# Patient Record
Sex: MALE | Race: WHITE | NOT HISPANIC OR LATINO
[De-identification: names, ages, dates, MRNs, and addresses within clinical notes are randomized per-mention and may not be internally consistent; named-entity substitution may affect disease eponyms.]

---

## 2020-04-03 ENCOUNTER — APPOINTMENT (OUTPATIENT)
Dept: DISASTER EMERGENCY | Facility: CLINIC | Age: 40
End: 2020-04-03
Payer: COMMERCIAL

## 2020-04-03 VITALS
TEMPERATURE: 99.3 F | OXYGEN SATURATION: 97 % | HEART RATE: 78 BPM | SYSTOLIC BLOOD PRESSURE: 114 MMHG | RESPIRATION RATE: 17 BRPM | DIASTOLIC BLOOD PRESSURE: 78 MMHG

## 2020-04-03 DIAGNOSIS — R68.89 OTHER GENERAL SYMPTOMS AND SIGNS: ICD-10-CM

## 2020-04-03 PROBLEM — Z00.00 ENCOUNTER FOR PREVENTIVE HEALTH EXAMINATION: Status: ACTIVE | Noted: 2020-04-03

## 2020-04-03 PROCEDURE — 99213 OFFICE O/P EST LOW 20 MIN: CPT

## 2020-04-03 NOTE — HISTORY OF PRESENT ILLNESS
[] : no dizziness on standing [With Suspected Case] : patient exposed to a suspected case of COVID-19 [Clear] : clear [Good Air Entry] : good air entry [Speaks in full sentences] : speaks in full sentences [Normal O2 sat at rest] : normal O2 sat at rest [Grossly normal, interacts, not tired or weak] : grossly normal, interacts, not tired or weak [FreeTextEntry1] : 7 days ago started having fever up to 101F, lower back pain,. Patient states sore throat went away, He has been taking tylenol for the fever, took tylenol last night for chills, feels SOB when  he goes up an  down the stairs   Patient states forehead and lower back are hurting. 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 Patient made aware that the test is uncomfortable\par He is advised to self quarantine, monitor temperature, remain  self quarantine un til he has no fever x 3 days without  \par anti fever medications, then practice social isolation  at all times

## 2020-04-07 LAB — SARS-COV-2 N GENE NPH QL NAA+PROBE: ABNORMAL

## 2020-12-08 ENCOUNTER — APPOINTMENT (OUTPATIENT)
Dept: NEUROLOGY | Facility: CLINIC | Age: 40
End: 2020-12-08
Payer: COMMERCIAL

## 2020-12-08 VITALS
DIASTOLIC BLOOD PRESSURE: 85 MMHG | SYSTOLIC BLOOD PRESSURE: 153 MMHG | BODY MASS INDEX: 24.34 KG/M2 | HEIGHT: 70 IN | HEART RATE: 83 BPM | WEIGHT: 170 LBS

## 2020-12-08 DIAGNOSIS — Z87.891 PERSONAL HISTORY OF NICOTINE DEPENDENCE: ICD-10-CM

## 2020-12-08 DIAGNOSIS — U07.1 COVID-19: ICD-10-CM

## 2020-12-08 DIAGNOSIS — R53.1 WEAKNESS: ICD-10-CM

## 2020-12-08 DIAGNOSIS — G44.51 HEMICRANIA CONTINUA: ICD-10-CM

## 2020-12-08 DIAGNOSIS — H53.40 UNSPECIFIED VISUAL FIELD DEFECTS: ICD-10-CM

## 2020-12-08 PROCEDURE — 99072 ADDL SUPL MATRL&STAF TM PHE: CPT

## 2020-12-08 PROCEDURE — 99205 OFFICE O/P NEW HI 60 MIN: CPT

## 2020-12-27 ENCOUNTER — APPOINTMENT (OUTPATIENT)
Dept: MRI IMAGING | Facility: CLINIC | Age: 40
End: 2020-12-27

## 2020-12-29 NOTE — ASSESSMENT
This RN arrived to ED01 to take patient to 6B after prior attempted transfer to 6B failed when daughter informed 6B that pt has a c-diff history.  Spoke with Daylin BURGESS RN in the ED who spoke with infectious disease MD. Per Daylin, the pt is not having loose stools and is okay to be in a double room. Pt transferred to 6B, upon transfer, Katharine LE 6B nurse assisted with moving patient to hospital bed.  Pt noted to be laying in a puddle of loose, foul smelling stool, with some dried to patient bottom.  Sample sent to lab per protocol.     [FreeTextEntry1] : 41 yo male h/o recent Covid-19 infection in April presents  with headache, dizziness, Left side weakness s/p syncope episode last month. Neuro exam findings with left upper field cut decreased strength on left concerning for right  posterior circulation defect he is already on asa and will be seeing PCP to start BP mgmt/statins\par Brain MRI/ MRA head and neck no previous imaging/reports for review. \par referred to opthalmology for visual field cut\par advised to go to the ED if he has any worsening symptoms

## 2020-12-29 NOTE — PHYSICAL EXAM
[Person] : oriented to person [Place] : oriented to place [Time] : oriented to time [Concentration Intact] : normal concentrating ability [Visual Intact] : visual attention was ~T not ~L decreased [Naming Objects] : no difficulty naming common objects [Repeating Phrases] : no difficulty repeating a phrase [Writing A Sentence] : no difficulty writing a sentence [Fluency] : fluency intact [Comprehension] : comprehension intact [Reading] : reading intact [Past History] : adequate knowledge of personal past history [Cranial Nerves Oculomotor (III)] : extraocular motion intact [Cranial Nerves Trigeminal (V)] : facial sensation intact symmetrically [Cranial Nerves Facial (VII)] : face symmetrical [Cranial Nerves Vestibulocochlear (VIII)] : hearing was intact bilaterally [Cranial Nerves Glossopharyngeal (IX)] : tongue and palate midline [Cranial Nerves Accessory (XI - Cranial And Spinal)] : head turning and shoulder shrug symmetric [Cranial Nerves Hypoglossal (XII)] : there was no tongue deviation with protrusion [Motor Tone] : muscle tone was normal in all four extremities [No Muscle Atrophy] : normal bulk in all four extremities [2+] : Ankle jerk left 2+ [PERRL With Normal Accommodation] : pupils were equal in size, round, reactive to light, with normal accommodation [Extraocular Movements] : extraocular movements were intact [Motor Handedness Right-Handed] : the patient is right hand dominant [] : no respiratory distress [Arterial Pulses Carotid] : carotid pulses were normal with no bruits [Past-pointing] : there was no past-pointing [Tremor] : no tremor present [FreeTextEntry6] : hand grap 4+/5 on left [FreeTextEntry7] : diminished light touch on left [FreeTextEntry8] : difficulty with tandem,  [FreeTextEntry1] : left  upper field cut

## 2020-12-29 NOTE — HISTORY OF PRESENT ILLNESS
[FreeTextEntry1] : Patient reports he was in his usual state of health until  November 19th he woke up feeling light headed, subsided and went to work as a .  He started to feel nauseous, stood up and took few steps , fell and hit his head +LOC for a few minutes according to a witness. Was taken to facility  and was given ASA, EMS was called and received SL NTG, vomited 5x after.\par He does not recall this, but only remembers being in the ambulance. \par \par He was taken to [place] and was noticed to have Left sided weakness, stroke code was called, evaluated by neuro-CXR, CTs negaive.  Stayed overnight for syncope work up. Troponin negative, EKG, echo were normal and was discharged to PMD\par \par He followed up with his PMD on 11.23.20 and was prescribed Fioricet for headaches which he has not tried. has taken Tylenol which has helped.  has been taking asa 81mg \par \par Today he reports since event,  left sided weakness and has been using a ball to strengthen his . He has been having headaches, frontal throbbing. last for a few hours, improves with Tylenol, dizziness described as  room spinning,has also been more forgetful\par sleep is fragmented. \par \par Denies any tinnitus, nausea, photophobia, phonophobia, chest pains or any palpitations. \par  \par \par

## 2021-01-20 ENCOUNTER — OUTPATIENT (OUTPATIENT)
Dept: OUTPATIENT SERVICES | Facility: HOSPITAL | Age: 41
LOS: 1 days | End: 2021-01-20
Payer: COMMERCIAL

## 2021-01-20 ENCOUNTER — APPOINTMENT (OUTPATIENT)
Dept: MRI IMAGING | Facility: CLINIC | Age: 41
End: 2021-01-20
Payer: COMMERCIAL

## 2021-01-20 ENCOUNTER — RESULT REVIEW (OUTPATIENT)
Age: 41
End: 2021-01-20

## 2021-01-20 DIAGNOSIS — H53.40 UNSPECIFIED VISUAL FIELD DEFECTS: ICD-10-CM

## 2021-01-20 DIAGNOSIS — R53.1 WEAKNESS: ICD-10-CM

## 2021-01-20 DIAGNOSIS — R51.9 HEADACHE, UNSPECIFIED: ICD-10-CM

## 2021-01-20 PROCEDURE — 70544 MR ANGIOGRAPHY HEAD W/O DYE: CPT | Mod: 26,59

## 2021-01-20 PROCEDURE — 70544 MR ANGIOGRAPHY HEAD W/O DYE: CPT

## 2021-01-20 PROCEDURE — 70551 MRI BRAIN STEM W/O DYE: CPT

## 2021-01-20 PROCEDURE — 70547 MR ANGIOGRAPHY NECK W/O DYE: CPT

## 2021-01-20 PROCEDURE — 70551 MRI BRAIN STEM W/O DYE: CPT | Mod: 26

## 2021-01-20 PROCEDURE — 70547 MR ANGIOGRAPHY NECK W/O DYE: CPT | Mod: 26

## 2021-02-02 ENCOUNTER — APPOINTMENT (OUTPATIENT)
Dept: NEUROLOGY | Facility: CLINIC | Age: 41
End: 2021-02-02

## 2021-02-09 ENCOUNTER — APPOINTMENT (OUTPATIENT)
Dept: NEUROLOGY | Facility: CLINIC | Age: 41
End: 2021-02-09
Payer: COMMERCIAL

## 2021-02-09 VITALS
WEIGHT: 175 LBS | BODY MASS INDEX: 25.05 KG/M2 | HEART RATE: 66 BPM | HEIGHT: 70 IN | SYSTOLIC BLOOD PRESSURE: 147 MMHG | DIASTOLIC BLOOD PRESSURE: 91 MMHG

## 2021-02-09 DIAGNOSIS — R51.9 HEADACHE, UNSPECIFIED: ICD-10-CM

## 2021-02-09 DIAGNOSIS — M54.2 CERVICALGIA: ICD-10-CM

## 2021-02-09 DIAGNOSIS — R55 SYNCOPE AND COLLAPSE: ICD-10-CM

## 2021-02-09 DIAGNOSIS — R42 DIZZINESS AND GIDDINESS: ICD-10-CM

## 2021-02-09 PROCEDURE — 99072 ADDL SUPL MATRL&STAF TM PHE: CPT

## 2021-02-09 PROCEDURE — 99215 OFFICE O/P EST HI 40 MIN: CPT

## 2021-02-11 PROBLEM — M54.2 CERVICALGIA: Status: ACTIVE | Noted: 2021-02-09

## 2021-02-11 NOTE — PHYSICAL EXAM
[Person] : oriented to person [Place] : oriented to place [Time] : oriented to time [Short Term Intact] : short term memory intact [Remote Intact] : remote memory intact [Registration Intact] : recent registration memory intact [Concentration Intact] : normal concentrating ability [Visual Intact] : visual attention was ~T not ~L decreased [Naming Objects] : no difficulty naming common objects [Repeating Phrases] : no difficulty repeating a phrase [Fluency] : fluency intact [Writing A Sentence] : no difficulty writing a sentence [Comprehension] : comprehension intact [Reading] : reading intact [Past History] : adequate knowledge of personal past history [Cranial Nerves Oculomotor (III)] : extraocular motion intact [Cranial Nerves Trigeminal (V)] : facial sensation intact symmetrically [Cranial Nerves Facial (VII)] : face symmetrical [Cranial Nerves Vestibulocochlear (VIII)] : hearing was intact bilaterally [Cranial Nerves Glossopharyngeal (IX)] : tongue and palate midline [Cranial Nerves Accessory (XI - Cranial And Spinal)] : head turning and shoulder shrug symmetric [Cranial Nerves Hypoglossal (XII)] : there was no tongue deviation with protrusion [Motor Tone] : muscle tone was normal in all four extremities [Motor Strength] : muscle strength was normal in all four extremities [No Muscle Atrophy] : normal bulk in all four extremities [Motor Handedness Right-Handed] : the patient is right hand dominant [Sensation Tactile Decrease] : light touch was intact [2+] : Ankle jerk left 2+ [Abnormal Walk] : normal gait [Past-pointing] : there was no past-pointing [Tremor] : no tremor present [FreeTextEntry8] : mild difficulty with tandem,  [PERRL With Normal Accommodation] : pupils were equal in size, round, and reactive to light [FreeTextEntry1] : able to see movement in left field

## 2021-02-11 NOTE — ASSESSMENT
[FreeTextEntry1] : 39 yo male h/o recent Covid-19 infection in April, s/p syncope presents with headache, c/o intermittent room spinning. since last visit, his Left side weakness has resolved and improvement left field vision    MRI with retrocerebellar cyst .His EEG is still pending and continues to have some stm difficulty. ? post concunssive  advised to see opthalmology pending consult \par will refer for ent to assess for true vestibular dysfunction \par \par

## 2021-02-11 NOTE — HISTORY OF PRESENT ILLNESS
[FreeTextEntry1] : Patient was last seen in December. Since then, he reports he can still have some mild headaches. He did not see opthalmology. He notes he has been under the care of cardiology for ?blockage and is pending CATH.\par He has returned to work, light duty. \par \par He reports he can still have moments where has to focus to get his thoughts out. EEG was not completed.\par Does have some Neck pain.\par \par \par Diagnostics:\par MRI brain: No acute infarct, hemorrhage or mass lesion. 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

## 2021-03-26 VITALS
RESPIRATION RATE: 18 BRPM | SYSTOLIC BLOOD PRESSURE: 128 MMHG | HEART RATE: 83 BPM | DIASTOLIC BLOOD PRESSURE: 76 MMHG | TEMPERATURE: 98 F | OXYGEN SATURATION: 98 %

## 2021-03-26 RX ORDER — CHLORHEXIDINE GLUCONATE 213 G/1000ML
1 SOLUTION TOPICAL ONCE
Refills: 0 | Status: DISCONTINUED | OUTPATIENT
Start: 2021-03-30 | End: 2021-04-13

## 2021-03-26 NOTE — H&P ADULT - HISTORY OF PRESENT ILLNESS
*************** SKELETON **********************      CARDIOLOGIST: Dr. Isaías Hadley  COVID: ____________________  PHARMACY: ________________    Pt is 39yo ______smoking???_____ M w/ FHx of CAD (____father w/ MI??? stents???) and PMHx of _______ HTN, HLD, pre-DM, and TIA (recent; residual defects???__________) who presented to cardiologist, Dr. Isaías Hadley, for cardiac evaluation following a syncopal episode. Patient reports he has had CHEST PAIN _____ and ELLIS _________.    Pt denies ________.     NST (12/24/2020): diminished perfusion in apical region consistent w/ ischemia; LVEF 65%.    In light of patient’s risk factors, CCS Class ____ Anginal symptoms, and abnormal NST results, patient now presents to [facility] for cardiac catheterization with possible intervention if clinically indicated.         *************** SKELETON **********************      CARDIOLOGIST: Dr. Isaías Hadley  COVID: ____________________  PHARMACY: ________________    Pt is 39yo ______smoking???_____ M w/ FHx of CAD (____father w/ MI??? stents???) and PMHx of _______ HTN, HLD, pre-DM, Hx of COVID-19 Infection (4/2020; ________), and TIA (recent; residual defects???__________) who presented to cardiologist, Dr. Isaías Hadley, for cardiac evaluation following a syncopal episode. Patient reports he has had CHEST PAIN _____ and ELLIS _________.    Pt denies ________.     NST (12/24/2020): diminished perfusion in apical region consistent w/ ischemia; LVEF 65%.    In light of patient’s risk factors, CCS Class ____ Anginal symptoms, and abnormal NST results, patient now presents to [facility] for cardiac catheterization with possible intervention if clinically indicated.     CARDIOLOGIST: Dr. Isaías Hadley  COVID: _______negative on 03/27 per HIE_____________  PHARMACY: ______[facility] [address] [place] [address]__________  Escort: friend    Pt is 39yo Male w/ FHx of CAD (Father MI in 60s) and PMHx of HTN, HLD, pre-DM, Hx of COVID-19 Infection (4/2020; not hospitalized, no medications), and TIA (in ) who presented to cardiologist, Dr. Isaías Hadley, for cardiac evaluation following a syncopal episode. Patient reports he has had CHEST PAIN _____ and ELLIS _________.    Pt denies ________.     NST (12/24/2020): diminished perfusion in apical region consistent w/ ischemia; LVEF 65%.    In light of patient’s risk factors, CCS Class ____ Anginal symptoms, and abnormal NST results, patient now presents to [facility] for cardiac catheterization with possible intervention if clinically indicated.     CARDIOLOGIST: Dr. Isaías Hadley  COVID: _______negative on 03/27 per HIE_____________  PHARMACY: ______[facility] [address] [place] [address]__________  Escort: friend    Pt is 41yo Male w/ FHx of CAD (Father MI in 60s) and PMHx of HTN, HLD, pre-DM, Hx of COVID-19 Infection (4/2020; not hospitalized, no medications), and TIA (in ) who presented to cardiologist, Dr. Isaías Hadley, for cardiac evaluation following a syncopal episode. Patient reports he has had chest pain independent of activity and dyspnea on exertion with minimal exertion worsening over the last 4 months. Pt has also been endorsing left calf pain x 1 week, denies trauma. Pt had an episode of chest tightness and subsequent syncopal episode late 2020 with overnight stay at [facility]. As per pt CT head negative. Pt followed up with a neurologist who as per pt said everything was fine from a neurologic standpoint. Pt had COVID infection in 04/2020 with no hospitalization or medications. Pt denies palpitations, diaphoresis, N/V, LE edema, orthopnea. Outpt workup included NST (12/24/2020): diminished perfusion in apical region consistent w/ ischemia; LVEF 65%.    In light of patient’s risk factors, CCS Class IV Anginal symptoms, and abnormal NST results, patient now presents to [facility] for cardiac catheterization with possible intervention if clinically indicated.     CARDIOLOGIST: Dr. Isaías Hadley  COVID: _______negative on 03/27 per HIE_____________  PHARMACY: ______[facility] [address] [place] [address]__________  Escort: friend    Pt is 39yo Male w/ FHx of CAD (Father MI in 60s) and PMHx of HTN, HLD, pre-DM, Hx of COVID-19 Infection (4/2020; not hospitalized, no medications), and TIA (syncope ruled TIA, residual hearing deficit) who presented to cardiologist, Dr. Isaías Hadley, for cardiac evaluation following a syncopal episode. Patient reports he has had chest pain independent of activity and dyspnea on exertion with minimal exertion worsening over the last 4 months. Pt has also been endorsing left calf pain x 1 week, denies trauma. Pt had an episode of chest tightness and subsequent syncopal episode late 2020 with overnight stay at [facility]. As per pt CT head negative but pt reports residual hearing deficits since. Pt followed up with a neurologist who as per pt said everything was fine from a neurologic standpoint. Pt had COVID infection in 04/2020 with no hospitalization or medications. Pt denies palpitations, diaphoresis, N/V, LE edema, orthopnea. Outpt workup included NST (12/24/2020): diminished perfusion in apical region consistent w/ ischemia; LVEF 65%.    In light of patient’s risk factors, CCS Class IV Anginal symptoms, and abnormal NST results, patient now presents to [facility] for cardiac catheterization with possible intervention if clinically indicated.     CARDIOLOGIST: Dr. Isaías Hadley  COVID: _______negative on 03/27 per HIE_____________  PHARMACY: ______[facility] [address] [place] [address]__________  Escort: friend    Pt is 41yo Male w/ FHx of CAD (Father MI in 60s) and PMHx of HTN, HLD, pre-DM, Hx of COVID-19 Infection (4/2020; not hospitalized, no medications), and TIA (syncope ruled TIA, residual hearing deficit) who presented to cardiologist, Dr. Isaías Hadley, for cardiac evaluation following a syncopal episode. Patient reports he has had chest pain independent of activity and dyspnea on exertion with minimal exertion worsening over the last 4 months. Pt has also been endorsing left calf pain x 1 week, denies trauma. Pt had an episode of chest tightness and subsequent syncopal episode late 2020 with overnight stay at [facility]. As per pt CT head negative but pt reports residual hearing deficits since. Pt followed up with a neurologist who as per pt said everything was fine from a neurologic standpoint. Pt had COVID infection in 04/2020 with no hospitalization or medications. Pt denies palpitations, diaphoresis, N/V, LE edema, orthopnea. Outpt workup included NST (12/24/2020): diminished perfusion in apical region consistent w/ ischemia; LVEF 65%.  In light of patient’s risk factors, CCS Class IV Anginal symptoms, and abnormal NST results, patient now presents to [facility] for cardiac catheterization with possible intervention if clinically indicated.    Risks & benefits of procedure and alternative therapy have been explained to the patient including but not limited to: allergic reaction, bleeding with possible need for blood transfusion, infection, renal and vascular compromise, limb damage, arrhythmia, stroke, vessel dissection/perforation, myocardial infarction, and emergent CABG. Informed consent obtained at bedside and included in chart.

## 2021-03-26 NOTE — H&P ADULT - NSHPPHYSICALEXAM_GEN_ALL_CORE
Mallampatti score: 2  ASA class: 2 Mallampatti score: 2  ASA class: 2    EKG: NSR @68bpm w/o ischemic changes.    This pt is a candidate for moderate sedation.

## 2021-03-26 NOTE — H&P ADULT - NSICDXPASTMEDICALHX_GEN_ALL_CORE_FT
PAST MEDICAL HISTORY:  HLD (hyperlipidemia)     HTN (hypertension)     Prediabetes     Transient ischemic attack

## 2021-03-26 NOTE — H&P ADULT - NSHPLABSRESULTS_GEN_ALL_CORE
14.8   7.31  )-----------( 221      ( 30 Mar 2021 07:06 )             46.3   03-30    138  |  99  |  18  ----------------------------<  103<H>  4.2   |  29  |  1.16    Ca    9.9      30 Mar 2021 07:06    TPro  8.0  /  Alb  4.7  /  TBili  0.4  /  DBili  x   /  AST  36  /  ALT  66<H>  /  AlkPhos  87  03-30    PT/INR - ( 30 Mar 2021 07:06 )   PT: 11.4 sec;   INR: 0.95     PTT - ( 30 Mar 2021 07:06 )  PTT:32.8 sec

## 2021-03-26 NOTE — H&P ADULT - ASSESSMENT
Risks & benefits of procedure and alternative therapy have been explained to the patient including but not limited to: allergic reaction, bleeding w/possible need for blood transfusion, infection, renal and vascular compromise, limb damage, arrhythmia, stroke, vessel dissection/perforation, Myocardial infarction, emergent CABG. Informed consent obtained and in chart.   Pt is 39yo Male w/ FHx of CAD (Father MI in 60s) and PMHx of HTN, HLD, pre-DM, Hx of COVID-19 Infection (4/2020; not hospitalized, no medications), and TIA, who presented to cardiologist, Dr. Isaías Hadley, for cardiac evaluation following a syncopal episode. In light of patient’s risk factors, CCS Class IV Anginal symptoms, and abnormal NST results, patient now presents to [facility] for cardiac catheterization with possible intervention if clinically indicated. Pt is 39yo Male w/ FHx of CAD (Father MI in 60s) and PMHx of HTN, HLD, pre-DM, Hx of COVID-19 Infection (4/2020; not hospitalized, no medications), and TIA, who presented to cardiologist, Dr. Isaías Hadley, for cardiac evaluation following a syncopal episode. In light of patient’s risk factors, CCS Class IV Anginal symptoms, and abnormal NST results, patient now presents to [facility] for cardiac catheterization with possible intervention if clinically indicated.    Risks & benefits of procedure and alternative therapy have been explained to the patient including but not limited to: allergic reaction, bleeding w/possible need for blood transfusion, infection, renal and vascular compromise, limb damage, arrhythmia, stroke, vessel dissection/perforation, Myocardial infarction, emergent CABG. Informed consent obtained and in chart.

## 2021-03-29 RX ORDER — METOPROLOL TARTRATE 50 MG
1 TABLET ORAL
Qty: 0 | Refills: 0 | DISCHARGE

## 2021-03-29 RX ORDER — LOSARTAN POTASSIUM 100 MG/1
1 TABLET, FILM COATED ORAL
Qty: 0 | Refills: 0 | DISCHARGE

## 2021-03-30 ENCOUNTER — OUTPATIENT (OUTPATIENT)
Dept: OUTPATIENT SERVICES | Facility: HOSPITAL | Age: 41
LOS: 1 days | Discharge: HOME | End: 2021-03-30
Payer: COMMERCIAL

## 2021-03-30 LAB
A1C WITH ESTIMATED AVERAGE GLUCOSE RESULT: 6.1 % — HIGH (ref 4–5.6)
ALBUMIN SERPL ELPH-MCNC: 4.7 G/DL — SIGNIFICANT CHANGE UP (ref 3.3–5)
ALP SERPL-CCNC: 87 U/L — SIGNIFICANT CHANGE UP (ref 40–120)
ALT FLD-CCNC: 66 U/L — HIGH (ref 10–45)
ANION GAP SERPL CALC-SCNC: 10 MMOL/L — SIGNIFICANT CHANGE UP (ref 5–17)
APTT BLD: 32.8 SEC — SIGNIFICANT CHANGE UP (ref 27.5–35.5)
AST SERPL-CCNC: 36 U/L — SIGNIFICANT CHANGE UP (ref 10–40)
BASOPHILS # BLD AUTO: 0.04 K/UL — SIGNIFICANT CHANGE UP (ref 0–0.2)
BASOPHILS NFR BLD AUTO: 0.5 % — SIGNIFICANT CHANGE UP (ref 0–2)
BILIRUB SERPL-MCNC: 0.4 MG/DL — SIGNIFICANT CHANGE UP (ref 0.2–1.2)
BUN SERPL-MCNC: 18 MG/DL — SIGNIFICANT CHANGE UP (ref 7–23)
CALCIUM SERPL-MCNC: 9.9 MG/DL — SIGNIFICANT CHANGE UP (ref 8.4–10.5)
CHLORIDE SERPL-SCNC: 99 MMOL/L — SIGNIFICANT CHANGE UP (ref 96–108)
CHOLEST SERPL-MCNC: 233 MG/DL — HIGH
CK MB CFR SERPL CALC: 3.9 NG/ML — SIGNIFICANT CHANGE UP (ref 0–6.7)
CK SERPL-CCNC: 311 U/L — HIGH (ref 30–200)
CO2 SERPL-SCNC: 29 MMOL/L — SIGNIFICANT CHANGE UP (ref 22–31)
CREAT SERPL-MCNC: 1.16 MG/DL — SIGNIFICANT CHANGE UP (ref 0.5–1.3)
EOSINOPHIL # BLD AUTO: 0.26 K/UL — SIGNIFICANT CHANGE UP (ref 0–0.5)
EOSINOPHIL NFR BLD AUTO: 3.6 % — SIGNIFICANT CHANGE UP (ref 0–6)
ESTIMATED AVERAGE GLUCOSE: 128 MG/DL — HIGH (ref 68–114)
GLUCOSE SERPL-MCNC: 103 MG/DL — HIGH (ref 70–99)
HCT VFR BLD CALC: 46.3 % — SIGNIFICANT CHANGE UP (ref 39–50)
HDLC SERPL-MCNC: 53 MG/DL — SIGNIFICANT CHANGE UP
HGB BLD-MCNC: 14.8 G/DL — SIGNIFICANT CHANGE UP (ref 13–17)
IMM GRANULOCYTES NFR BLD AUTO: 0.3 % — SIGNIFICANT CHANGE UP (ref 0–1.5)
INR BLD: 0.95 — SIGNIFICANT CHANGE UP (ref 0.88–1.16)
LIPID PNL WITH DIRECT LDL SERPL: 130 MG/DL — HIGH
LYMPHOCYTES # BLD AUTO: 2.86 K/UL — SIGNIFICANT CHANGE UP (ref 1–3.3)
LYMPHOCYTES # BLD AUTO: 39.1 % — SIGNIFICANT CHANGE UP (ref 13–44)
MCHC RBC-ENTMCNC: 30 PG — SIGNIFICANT CHANGE UP (ref 27–34)
MCHC RBC-ENTMCNC: 32 GM/DL — SIGNIFICANT CHANGE UP (ref 32–36)
MCV RBC AUTO: 93.7 FL — SIGNIFICANT CHANGE UP (ref 80–100)
MONOCYTES # BLD AUTO: 0.58 K/UL — SIGNIFICANT CHANGE UP (ref 0–0.9)
MONOCYTES NFR BLD AUTO: 7.9 % — SIGNIFICANT CHANGE UP (ref 2–14)
NEUTROPHILS # BLD AUTO: 3.55 K/UL — SIGNIFICANT CHANGE UP (ref 1.8–7.4)
NEUTROPHILS NFR BLD AUTO: 48.6 % — SIGNIFICANT CHANGE UP (ref 43–77)
NON HDL CHOLESTEROL: 180 MG/DL — HIGH
NRBC # BLD: 0 /100 WBCS — SIGNIFICANT CHANGE UP (ref 0–0)
PLATELET # BLD AUTO: 221 K/UL — SIGNIFICANT CHANGE UP (ref 150–400)
POTASSIUM SERPL-MCNC: 4.2 MMOL/L — SIGNIFICANT CHANGE UP (ref 3.5–5.3)
POTASSIUM SERPL-SCNC: 4.2 MMOL/L — SIGNIFICANT CHANGE UP (ref 3.5–5.3)
PROT SERPL-MCNC: 8 G/DL — SIGNIFICANT CHANGE UP (ref 6–8.3)
PROTHROM AB SERPL-ACNC: 11.4 SEC — SIGNIFICANT CHANGE UP (ref 10.6–13.6)
RBC # BLD: 4.94 M/UL — SIGNIFICANT CHANGE UP (ref 4.2–5.8)
RBC # FLD: 12.4 % — SIGNIFICANT CHANGE UP (ref 10.3–14.5)
SODIUM SERPL-SCNC: 138 MMOL/L — SIGNIFICANT CHANGE UP (ref 135–145)
TRIGL SERPL-MCNC: 249 MG/DL — HIGH
WBC # BLD: 7.31 K/UL — SIGNIFICANT CHANGE UP (ref 3.8–10.5)
WBC # FLD AUTO: 7.31 K/UL — SIGNIFICANT CHANGE UP (ref 3.8–10.5)

## 2021-03-30 PROCEDURE — 80061 LIPID PANEL: CPT

## 2021-03-30 PROCEDURE — 85025 COMPLETE CBC W/AUTO DIFF WBC: CPT

## 2021-03-30 PROCEDURE — 82553 CREATINE MB FRACTION: CPT

## 2021-03-30 PROCEDURE — 83036 HEMOGLOBIN GLYCOSYLATED A1C: CPT

## 2021-03-30 PROCEDURE — C1760: CPT

## 2021-03-30 PROCEDURE — C1769: CPT

## 2021-03-30 PROCEDURE — 93005 ELECTROCARDIOGRAM TRACING: CPT

## 2021-03-30 PROCEDURE — C1887: CPT

## 2021-03-30 PROCEDURE — 85730 THROMBOPLASTIN TIME PARTIAL: CPT

## 2021-03-30 PROCEDURE — 82550 ASSAY OF CK (CPK): CPT

## 2021-03-30 PROCEDURE — 99153 MOD SED SAME PHYS/QHP EA: CPT

## 2021-03-30 PROCEDURE — C1894: CPT

## 2021-03-30 PROCEDURE — 93458 L HRT ARTERY/VENTRICLE ANGIO: CPT

## 2021-03-30 PROCEDURE — 85610 PROTHROMBIN TIME: CPT

## 2021-03-30 PROCEDURE — 99152 MOD SED SAME PHYS/QHP 5/>YRS: CPT

## 2021-03-30 PROCEDURE — 80053 COMPREHEN METABOLIC PANEL: CPT

## 2021-03-30 PROCEDURE — 36415 COLL VENOUS BLD VENIPUNCTURE: CPT

## 2021-03-30 PROCEDURE — 93458 L HRT ARTERY/VENTRICLE ANGIO: CPT | Mod: 26

## 2021-03-30 PROCEDURE — 93010 ELECTROCARDIOGRAM REPORT: CPT | Mod: 59

## 2021-03-30 RX ORDER — CLOPIDOGREL BISULFATE 75 MG/1
600 TABLET, FILM COATED ORAL ONCE
Refills: 0 | Status: COMPLETED | OUTPATIENT
Start: 2021-03-30 | End: 2021-03-30

## 2021-03-30 RX ORDER — ASPIRIN/CALCIUM CARB/MAGNESIUM 324 MG
325 TABLET ORAL ONCE
Refills: 0 | Status: COMPLETED | OUTPATIENT
Start: 2021-03-30 | End: 2021-03-30

## 2021-03-30 RX ORDER — ASPIRIN/CALCIUM CARB/MAGNESIUM 324 MG
1 TABLET ORAL
Qty: 0 | Refills: 0 | DISCHARGE

## 2021-03-30 RX ORDER — ACETAMINOPHEN 500 MG
1000 TABLET ORAL ONCE
Refills: 0 | Status: COMPLETED | OUTPATIENT
Start: 2021-03-30 | End: 2021-03-30

## 2021-03-30 RX ORDER — SODIUM CHLORIDE 9 MG/ML
500 INJECTION INTRAMUSCULAR; INTRAVENOUS; SUBCUTANEOUS
Refills: 0 | Status: DISCONTINUED | OUTPATIENT
Start: 2021-03-30 | End: 2021-04-13

## 2021-03-30 RX ORDER — ATORVASTATIN CALCIUM 80 MG/1
1 TABLET, FILM COATED ORAL
Qty: 30 | Refills: 3
Start: 2021-03-30 | End: 2021-07-27

## 2021-03-30 RX ORDER — MORPHINE SULFATE 50 MG/1
2 CAPSULE, EXTENDED RELEASE ORAL ONCE
Refills: 0 | Status: DISCONTINUED | OUTPATIENT
Start: 2021-03-30 | End: 2021-03-30

## 2021-03-30 RX ORDER — SODIUM CHLORIDE 9 MG/ML
500 INJECTION INTRAMUSCULAR; INTRAVENOUS; SUBCUTANEOUS
Refills: 0 | Status: DISCONTINUED | OUTPATIENT
Start: 2021-03-30 | End: 2021-03-30

## 2021-03-30 RX ORDER — SIMVASTATIN 20 MG/1
1 TABLET, FILM COATED ORAL
Qty: 0 | Refills: 0 | DISCHARGE

## 2021-03-30 RX ORDER — METOPROLOL TARTRATE 50 MG
1 TABLET ORAL
Qty: 0 | Refills: 0 | DISCHARGE

## 2021-03-30 RX ADMIN — MORPHINE SULFATE 2 MILLIGRAM(S): 50 CAPSULE, EXTENDED RELEASE ORAL at 13:05

## 2021-03-30 RX ADMIN — CLOPIDOGREL BISULFATE 600 MILLIGRAM(S): 75 TABLET, FILM COATED ORAL at 08:36

## 2021-03-30 RX ADMIN — SODIUM CHLORIDE 100 MILLILITER(S): 9 INJECTION INTRAMUSCULAR; INTRAVENOUS; SUBCUTANEOUS at 08:37

## 2021-03-30 RX ADMIN — MORPHINE SULFATE 2 MILLIGRAM(S): 50 CAPSULE, EXTENDED RELEASE ORAL at 12:53

## 2021-03-30 RX ADMIN — Medication 400 MILLIGRAM(S): at 14:12

## 2021-03-30 RX ADMIN — SODIUM CHLORIDE 75 MILLILITER(S): 9 INJECTION INTRAMUSCULAR; INTRAVENOUS; SUBCUTANEOUS at 12:54

## 2021-03-30 RX ADMIN — Medication 325 MILLIGRAM(S): at 08:36

## 2021-03-30 NOTE — PROGRESS NOTE ADULT - SUBJECTIVE AND OBJECTIVE BOX
Interventional Cardiology PA SDA Discharge Note    Patient without complaints. Ambulated and voided without difficulties    Afebrile, VSS    Ext: Right Groin: no hematoma, no bruit, dressing; C/D/I  Right Radial : no hematoma, no bleeding, dressing; C/D/I    Pulses: intact RAD/DP/PT?to baseline     A/P:  41yo Male w/ FHx of CAD (Father MI in 60s) and PMHx of HTN, HLD, pre-DM, Hx of COVID-19 Infection (4/2020; not hospitalized, no medications), and TIA (syncope ruled TIA, residual hearing deficit) who presented to cardiologist, Dr. Isaías Hadley, for cardiac evaluation following a syncopal episode. Patient reports he has had chest pain independent of activity and dyspnea on exertion with minimal exertion worsening over the last 4 months. Pt has also been endorsing left calf pain x 1 week, denies trauma. Pt had an episode of chest tightness and subsequent syncopal episode late 2020 with overnight stay at [facility]. As per pt CT head negative but pt reports residual hearing deficits since. Pt followed up with a neurologist who as per pt said everything was fine from a neurologic standpoint. Pt had COVID infection in 04/2020 with no hospitalization or medications. Pt denies palpitations, diaphoresis, N/V, LE edema, orthopnea. Outpt workup included NST (12/24/2020): diminished perfusion in apical region consistent w/ ischemia; LVEF 65%.     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 R radial and R groin access.    , Trig 249, HDL 53. Pt is on Simvastatin 20mg PO QD, prescription was sent for Atorvastatin 40mg PO QD.     1.	Stable for discharge as per attending Dr. Hadley after bed rest, pt voids, groin/wrist stable and 30 minutes of ambulation.  2.	Follow-up with PMD/Cardiologist Dr. Isaías Hadley in 1-2 weeks  3.	Discharged forms signed and copies in chart    Interventional Cardiology PA SDA Discharge Note    Patient without complaints. Ambulated and voided without difficulties    Afebrile, VSS    Ext: Right Groin: no hematoma, no bruit, dressing; C/D/I  Right Radial : no hematoma, no bleeding, dressing; C/D/I    Pulses: intact RAD/DP/PT?to baseline     A/P:  39yo Male w/ FHx of CAD (Father MI in 60s) and PMHx of HTN, HLD, pre-DM, Hx of COVID-19 Infection (4/2020; not hospitalized, no medications), and TIA (syncope ruled TIA, residual hearing deficit) who presented to cardiologist, Dr. Isaías Hadley, for cardiac evaluation following a syncopal episode. Patient reports he has had chest pain independent of activity and dyspnea on exertion with minimal exertion worsening over the last 4 months. Pt has also been endorsing left calf pain x 1 week, denies trauma. Pt had an episode of chest tightness and subsequent syncopal episode late 2020 with overnight stay at [facility]. As per pt CT head negative but pt reports residual hearing deficits since. Pt followed up with a neurologist who as per pt said everything was fine from a neurologic standpoint. Pt had COVID infection in 04/2020 with no hospitalization or medications. Pt denies palpitations, diaphoresis, N/V, LE edema, orthopnea. Outpt workup included NST (12/24/2020): diminished perfusion in apical region consistent w/ ischemia; LVEF 65%.     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 R radial and R groin access.    , Trig 249, HDL 53. Pt is on Simvastatin 20mg PO QD, prescription was sent for Atorvastatin 40mg PO QD.     1.	Stable for discharge as per attending Dr. Hadley after bed rest, pt voids, groin/wrist stable and 30 minutes of ambulation.  2.	Follow-up with PMD/Cardiologist Dr. Isaías Hadley in 1-2 weeks  3.	Discharged forms signed and copies in chart    Interventional Cardiology PA SDA Discharge Note    Patient without complaints. Ambulated and voided without difficulties    Afebrile, VSS    Ext: Right Groin: no hematoma, no bruit, dressing; C/D/I  Right Radial : no hematoma, no bleeding, dressing; C/D/I    Pulses: intact RAD/DP/PT?to baseline     A/P:  41yo Male w/ FHx of CAD (Father MI in 60s) and PMHx of HTN, HLD, pre-DM, Hx of COVID-19 Infection (4/2020; not hospitalized, no medications), and TIA (syncope ruled TIA, residual hearing deficit) who presented to cardiologist, Dr. Isaías Hadley, for cardiac evaluation following a syncopal episode. Patient reports he has had chest pain independent of activity and dyspnea on exertion with minimal exertion worsening over the last 4 months. Pt has also been endorsing left calf pain x 1 week, denies trauma. Pt had an episode of chest tightness and subsequent syncopal episode late 2020 with overnight stay at [facility]. As per pt CT head negative but pt reports residual hearing deficits since. Pt followed up with a neurologist who as per pt said everything was fine from a neurologic standpoint. Pt had COVID infection in 04/2020 with no hospitalization or medications. Pt denies palpitations, diaphoresis, N/V, LE edema, orthopnea. Outpt workup included NST (12/24/2020): diminished perfusion in apical region consistent w/ ischemia; LVEF 65%.     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 R radial and R groin access. Intraprocedure pt had a vagal episode, was given Atropine 0.5mg IV x 1, Edgar 200mg IV x 1 and NS 50 cc bolus with improvement.     , Trig 249, HDL 53. Pt is on Simvastatin 20mg PO QD at home, prescription was sent for Atorvastatin 40mg PO QD.     1.	Stable for discharge as per attending Dr. Hadley after bed rest, pt voids, groin/wrist stable and 30 minutes of ambulation.  2.	Follow-up with PMD/Cardiologist Dr. Isaías Hadley in 1-2 weeks  3.	Discharged forms signed and copies in chart    Interventional Cardiology PA SDA Discharge Note    Patient without complaints. Ambulated and voided without difficulties    Afebrile, VSS    Ext: Right Groin: no hematoma, no bruit, dressing; C/D/I  Right Radial : upon discharge - no hematoma, no bleeding, dressing; C/D/I    Pulses: intact RAD/DP/PT to baseline     A/P:  41yo Male w/ FHx of CAD (Father MI in 60s) and PMHx of HTN, HLD, pre-DM, Hx of COVID-19 Infection (4/2020; not hospitalized, no medications), and TIA (syncope ruled TIA, residual hearing deficit) who presented to cardiologist, Dr. Isaías Hadley, for cardiac evaluation following a syncopal episode. Patient reports he has had chest pain independent of activity and dyspnea on exertion with minimal exertion worsening over the last 4 months. Pt has also been endorsing left calf pain x 1 week, denies trauma. Pt had an episode of chest tightness and subsequent syncopal episode late 2020 with overnight stay at [facility]. As per pt CT head negative but pt reports residual hearing deficits since. Pt followed up with a neurologist who as per pt said everything was fine from a neurologic standpoint. Pt had COVID infection in 04/2020 with no hospitalization or medications. Pt denies palpitations, diaphoresis, N/V, LE edema, orthopnea. Outpt workup included NST (12/24/2020): diminished perfusion in apical region consistent w/ ischemia; LVEF 65%.     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 R radial and R groin access. Intraprocedure pt had a vagal episode, was given Atropine 0.5mg IV x 1, Edgar 200mg IV x 1 and NS 50 cc bolus with improvement.     , Trig 249, HDL 53. Pt is on Simvastatin 20mg PO QD at home, prescription was sent for Atorvastatin 40mg PO QD.       Pt with R arm pain 10/10 radiating to R shoulder post-procedure. R rad access site intact, no bleeding, hematoma. Fellow Dr. Francis Shanks evaluated pt arm. Pt given morphine 2mg IVP x 1 with brief relief. Pain returned and pt's forearm felt slightly more firm than L arm. Suspicion for hematoma was low, but arm was wrapped with coban and manual pressure was applied throughout the arm. Tylenol IV ordered. Pt states arm is feeling better now.     1.	Stable for discharge as per attending Dr. Hadley after bed rest, pt voids, groin/wrist stable and 30 minutes of ambulation.  2.	Follow-up with PMD/Cardiologist Dr. Isaías Hadley in 1-2 weeks  3.	Discharged forms signed and copies in chart

## 2021-04-02 DIAGNOSIS — I25.110 ATHEROSCLEROTIC HEART DISEASE OF NATIVE CORONARY ARTERY WITH UNSTABLE ANGINA PECTORIS: ICD-10-CM

## 2021-04-02 DIAGNOSIS — Z82.49 FAMILY HISTORY OF ISCHEMIC HEART DISEASE AND OTHER DISEASES OF THE CIRCULATORY SYSTEM: ICD-10-CM

## 2021-04-02 DIAGNOSIS — I10 ESSENTIAL (PRIMARY) HYPERTENSION: ICD-10-CM

## 2021-04-02 DIAGNOSIS — E78.5 HYPERLIPIDEMIA, UNSPECIFIED: ICD-10-CM

## 2025-07-24 ENCOUNTER — APPOINTMENT (OUTPATIENT)
Dept: OTOLARYNGOLOGY | Facility: CLINIC | Age: 45
End: 2025-07-24

## 2025-07-25 ENCOUNTER — APPOINTMENT (OUTPATIENT)
Dept: OTOLARYNGOLOGY | Facility: CLINIC | Age: 45
End: 2025-07-25
Payer: COMMERCIAL

## 2025-07-25 ENCOUNTER — NON-APPOINTMENT (OUTPATIENT)
Age: 45
End: 2025-07-25

## 2025-07-25 VITALS
BODY MASS INDEX: 25.77 KG/M2 | SYSTOLIC BLOOD PRESSURE: 126 MMHG | HEART RATE: 80 BPM | WEIGHT: 180 LBS | DIASTOLIC BLOOD PRESSURE: 78 MMHG | HEIGHT: 70 IN

## 2025-07-25 DIAGNOSIS — J01.90 ACUTE SINUSITIS, UNSPECIFIED: ICD-10-CM

## 2025-07-25 DIAGNOSIS — Z86.39 PERSONAL HISTORY OF OTHER ENDOCRINE, NUTRITIONAL AND METABOLIC DISEASE: ICD-10-CM

## 2025-07-25 DIAGNOSIS — H65.192 OTHER ACUTE NONSUPPURATIVE OTITIS MEDIA, LEFT EAR: ICD-10-CM

## 2025-07-25 DIAGNOSIS — Z84.89 FAMILY HISTORY OF OTHER SPECIFIED CONDITIONS: ICD-10-CM

## 2025-07-25 PROCEDURE — 99204 OFFICE O/P NEW MOD 45 MIN: CPT

## 2025-07-25 RX ORDER — CIPROFLOXACIN 10 MG/ML
INJECTION, SOLUTION, CONCENTRATE INTRAVENOUS
Refills: 0 | Status: ACTIVE | COMMUNITY

## 2025-07-25 RX ORDER — CLARITHROMYCIN 500 MG/1
500 TABLET, FILM COATED ORAL
Qty: 20 | Refills: 0 | Status: ACTIVE | COMMUNITY
Start: 2025-07-25 | End: 1900-01-01

## 2025-07-25 RX ORDER — FLUTICASONE PROPIONATE 50 UG/1
50 SPRAY NASAL TWICE DAILY
Qty: 1 | Refills: 1 | Status: ACTIVE | COMMUNITY
Start: 2025-07-25 | End: 1900-01-01

## 2025-07-25 RX ORDER — AMOXICILLIN AND CLAVULANATE POTASSIUM 875; 125 MG/1; MG/1
875-125 TABLET, COATED ORAL
Refills: 0 | Status: ACTIVE | COMMUNITY

## 2025-07-25 RX ORDER — PREDNISONE 50 MG/1
TABLET ORAL
Refills: 0 | Status: ACTIVE | COMMUNITY

## 2025-08-12 ENCOUNTER — APPOINTMENT (OUTPATIENT)
Dept: OTOLARYNGOLOGY | Facility: CLINIC | Age: 45
End: 2025-08-12
Payer: COMMERCIAL

## 2025-08-12 VITALS
SYSTOLIC BLOOD PRESSURE: 133 MMHG | DIASTOLIC BLOOD PRESSURE: 83 MMHG | BODY MASS INDEX: 25.77 KG/M2 | HEIGHT: 70 IN | HEART RATE: 82 BPM | WEIGHT: 180 LBS

## 2025-08-12 DIAGNOSIS — H69.92 UNSPECIFIED EUSTACHIAN TUBE DISORDER, LEFT EAR: ICD-10-CM

## 2025-08-12 PROCEDURE — 99213 OFFICE O/P EST LOW 20 MIN: CPT
